# Patient Record
Sex: FEMALE | Race: OTHER | Employment: STUDENT | URBAN - METROPOLITAN AREA
[De-identification: names, ages, dates, MRNs, and addresses within clinical notes are randomized per-mention and may not be internally consistent; named-entity substitution may affect disease eponyms.]

---

## 2023-01-23 ENCOUNTER — OFFICE VISIT (OUTPATIENT)
Dept: HEMATOLOGY | Age: 23
End: 2023-01-23
Payer: OTHER GOVERNMENT

## 2023-01-23 ENCOUNTER — HOSPITAL ENCOUNTER (OUTPATIENT)
Dept: LAB | Age: 23
Discharge: HOME | End: 2023-01-23
Payer: OTHER GOVERNMENT

## 2023-01-23 VITALS
HEIGHT: 62 IN | HEART RATE: 97 BPM | DIASTOLIC BLOOD PRESSURE: 84 MMHG | OXYGEN SATURATION: 98 % | TEMPERATURE: 97.7 F | SYSTOLIC BLOOD PRESSURE: 129 MMHG | BODY MASS INDEX: 26.31 KG/M2 | WEIGHT: 143 LBS

## 2023-01-23 DIAGNOSIS — K76.9 CHRONIC LIVER DISEASE: Primary | ICD-10-CM

## 2023-01-23 DIAGNOSIS — K76.9 CHRONIC LIVER DISEASE: ICD-10-CM

## 2023-01-23 LAB
ALBUMIN SERPL-MCNC: 4.4 G/DL (ref 3.4–5)
ALBUMIN/GLOB SERPL: 1.3 (ref 0.8–1.7)
ALP SERPL-CCNC: 59 U/L (ref 45–117)
ALT SERPL-CCNC: 21 U/L (ref 13–56)
ANION GAP SERPL CALC-SCNC: 7 MMOL/L (ref 3–18)
AST SERPL-CCNC: 21 U/L (ref 10–38)
BASOPHILS # BLD: 0 K/UL (ref 0–0.1)
BASOPHILS NFR BLD: 1 % (ref 0–2)
BILIRUB DIRECT SERPL-MCNC: 0.1 MG/DL (ref 0–0.2)
BILIRUB SERPL-MCNC: 0.5 MG/DL (ref 0.2–1)
BUN SERPL-MCNC: 11 MG/DL (ref 7–18)
BUN/CREAT SERPL: 16 (ref 12–20)
CALCIUM SERPL-MCNC: 9.3 MG/DL (ref 8.5–10.1)
CHLORIDE SERPL-SCNC: 102 MMOL/L (ref 100–111)
CO2 SERPL-SCNC: 28 MMOL/L (ref 21–32)
CREAT SERPL-MCNC: 0.7 MG/DL (ref 0.6–1.3)
DIFFERENTIAL METHOD BLD: ABNORMAL
EOSINOPHIL # BLD: 0.1 K/UL (ref 0–0.4)
EOSINOPHIL NFR BLD: 1 % (ref 0–5)
ERYTHROCYTE [DISTWIDTH] IN BLOOD BY AUTOMATED COUNT: 12.5 % (ref 11.6–14.5)
FERRITIN SERPL-MCNC: 38 NG/ML (ref 8–388)
GLOBULIN SER CALC-MCNC: 3.4 G/DL (ref 2–4)
GLUCOSE SERPL-MCNC: 113 MG/DL (ref 74–99)
HCT VFR BLD AUTO: 37.7 % (ref 35–45)
HGB BLD-MCNC: 12.9 G/DL (ref 12–16)
IMM GRANULOCYTES # BLD AUTO: 0 K/UL (ref 0–0.04)
IMM GRANULOCYTES NFR BLD AUTO: 0 % (ref 0–0.5)
INR PPP: 1 (ref 0.8–1.2)
IRON SATN MFR SERPL: 30 % (ref 20–50)
IRON SERPL-MCNC: 129 UG/DL (ref 50–175)
LYMPHOCYTES # BLD: 1.5 K/UL (ref 0.9–3.6)
LYMPHOCYTES NFR BLD: 22 % (ref 21–52)
MCH RBC QN AUTO: 33.7 PG (ref 24–34)
MCHC RBC AUTO-ENTMCNC: 34.2 G/DL (ref 31–37)
MCV RBC AUTO: 98.4 FL (ref 78–100)
MONOCYTES # BLD: 0.4 K/UL (ref 0.05–1.2)
MONOCYTES NFR BLD: 6 % (ref 3–10)
NEUTS SEG # BLD: 4.8 K/UL (ref 1.8–8)
NEUTS SEG NFR BLD: 70 % (ref 40–73)
NRBC # BLD: 0 K/UL (ref 0–0.01)
NRBC BLD-RTO: 0 PER 100 WBC
PLATELET # BLD AUTO: 291 K/UL (ref 135–420)
PMV BLD AUTO: 10.7 FL (ref 9.2–11.8)
POTASSIUM SERPL-SCNC: 3.6 MMOL/L (ref 3.5–5.5)
PROT SERPL-MCNC: 7.8 G/DL (ref 6.4–8.2)
PROTHROMBIN TIME: 14.1 SEC (ref 11.5–15.2)
RBC # BLD AUTO: 3.83 M/UL (ref 4.2–5.3)
SODIUM SERPL-SCNC: 137 MMOL/L (ref 136–145)
TIBC SERPL-MCNC: 423 UG/DL (ref 250–450)
WBC # BLD AUTO: 6.8 K/UL (ref 4.6–13.2)

## 2023-01-23 PROCEDURE — 87340 HEPATITIS B SURFACE AG IA: CPT

## 2023-01-23 PROCEDURE — 83540 ASSAY OF IRON: CPT

## 2023-01-23 PROCEDURE — 86706 HEP B SURFACE ANTIBODY: CPT

## 2023-01-23 PROCEDURE — 86803 HEPATITIS C AB TEST: CPT

## 2023-01-23 PROCEDURE — 82728 ASSAY OF FERRITIN: CPT

## 2023-01-23 PROCEDURE — 86704 HEP B CORE ANTIBODY TOTAL: CPT

## 2023-01-23 PROCEDURE — 86708 HEPATITIS A ANTIBODY: CPT

## 2023-01-23 PROCEDURE — 86381 MITOCHONDRIAL ANTIBODY EACH: CPT

## 2023-01-23 PROCEDURE — 85025 COMPLETE CBC W/AUTO DIFF WBC: CPT

## 2023-01-23 PROCEDURE — 85610 PROTHROMBIN TIME: CPT

## 2023-01-23 PROCEDURE — 86015 ACTIN ANTIBODY EACH: CPT

## 2023-01-23 PROCEDURE — 86037 ANCA TITER EACH ANTIBODY: CPT

## 2023-01-23 PROCEDURE — 82107 ALPHA-FETOPROTEIN L3: CPT

## 2023-01-23 PROCEDURE — 80076 HEPATIC FUNCTION PANEL: CPT

## 2023-01-23 PROCEDURE — 86038 ANTINUCLEAR ANTIBODIES: CPT

## 2023-01-23 PROCEDURE — 36415 COLL VENOUS BLD VENIPUNCTURE: CPT

## 2023-01-23 PROCEDURE — 80048 BASIC METABOLIC PNL TOTAL CA: CPT

## 2023-01-23 PROCEDURE — 81256 HFE GENE: CPT

## 2023-01-23 NOTE — PROGRESS NOTES
[facility], MD, Liza Parikh, Shay Awani Slim, [place]      ILENE Choi, [facility]-BC   Giovanni Velez, [facility]-   Albert Varner, FNALBA Brito, FNMARY-MONA [facility]      [address]   at [address], [facility] Blanca Verma  22.   [phone]   FAX: [phone]   at [address], [address] [place] [address], [address]   [phone]   FAX: [phone]       Patient Care Team:  Pamela De Jesus MD as PCP - General (Family Medicine)    Problem List  Date Reviewed: 1/23/2023   None      The clinicians listed above have asked me to see Demetrio Calvillo in consultation regarding elevated liver enzymes and its management. No medical records were available for review when the patient was here for the appointment. The patient is a 25 y.o.  female who was found to have elevated liver enzymes in 09/2022. Serologic evaluation for markers of chronic liver disease has either not been performed or the results     Imaging of the liver was either not performed or the results are not available to me. The patient had not started any new medications within 3 months preceding the elevation in liver chemistries. The patient has no complaints which can be attributed to liver disease. The patient completes all daily activities without any functional limitations.  The patient has not experienced fatigue, fevers, chills, shortness of breath, chest pain, pain in the right side over the liver, diffuse abdominal pain, nausea, vomiting, constipation, diarrhrea, dry eyes, dry mouth, arthralgias, myalgias, yellowing of the eyes or skin, itching, dark urine, problems concentrating, swelling of the abdomen, swelling of the lower extremities, hematemesis, or hematochezia. ASSESSMENT AND PLAN:  Elevated liver enzymes  Persistent elevation in liver transaminases of unclear etiology at this time. She was told that she has hemochromatosis, but there is no record of this. It does not appear that HFE genetic testing has been performed. Will perform laboratory testing to monitor liver function and degree of liver injury. The most recent laboratory studies indicate that the liver transaminases are normal, alkaline phosphatase is normal, tests of hepatic synthetic and metabolic function are normal, and the platelet count is normal.      Based upon laboratory studies the patient does not appear to have advanced liver disease or cirrhosis. Serologic testing for causes of chronic liver disease was ordered. This includes genetic mutation testing for hemochromatosis. The most likely causes for the liver chemistry abnormalities were discussed with the patient and include   fatty liver disease, immune liver disorders, genetic metabolic liver disorders. The need to perform an assessment of liver fibrosis was discussed with the patient. The Fibroscan can assess liver fibrosis and determine if a patient has advanced fibrosis or cirrhosis without the need for liver biopsy. This will be performed at the next office visit. If the Fibroscan suggests advanced fibrosis then a liver biopsy should be considered. The Fibroscan can be repeated annually or as often as clinically indicated to assess for fibrosis progression and/or regression. If the Fibroscan suggests there is none or minimal liver injury the patient will be monitored at 6 month intervals. If the liver enzymes remain normal and the liver stiffness by Fibroscan remains normal or near normal over the next 1-2 years than no further monitoring is needed.   If the liver enzymes remain intermittently elevated or become persistently elevated and/or the Fibroscan suggests that liver stiffness is increasing over the next 1-2 years than a liver biopsy should be considered. Will perform imaging of the liver with ultrasound. The need to perform a liver biopsy to help determine the cause and severity of the liver test abnormalities was discussed. The risks of performing the liver biopsy including pain, puncture of the lung, gallbladder, intestine or kidney and bleeding were discussed. Will defer liver biopsy for now. If the liver enzymes remain persistently elevated over the next 1-2 years a liver biopsy should be performed to ensure there is no ongoing chronic liver disease. Screening for Hepatocellular Carcinoma  HCC screening: AFP was ordered today and ultrasound will be scheduled. Treatment of other medical problems in patients with chronic liver disease  There are no contraindications for the patient to take most medications that are necessary for treatment of other medical issues. The patient can take any medications utilized for treatment of DM, statins to treat hypercholesterolemia  Normal doses of acetaminophen, as recommended on the label of the bottle, are not hepatotoxic except in the setting of daily alcohol use, even in patients with cirrhosis and can be utilized for pain. Counseling for alcohol in patients with chronic liver disease  The patient was counseled regarding alcohol consumption and the effect of alcohol on chronic liver disease. The patient does not consume any significant amount of alcohol. Vaccinations   The need for vaccination against viral hepatitis A and B will be assessed with serologic and instituted as appropriate. The patient has received 2 doses and the 2 booster doses of COVID-19 vaccine. The patient had COVID-19 infection and recovered. Routine vaccinations against other bacterial and viral agents can be performed as indicated. Annual flu vaccination should be administered if indicated.     ALLERGIES  No Known Allergies    MEDICATIONS  No current outpatient medications on file. No current facility-administered medications for this visit. SYSTEM REVIEW NOT RELATED TO LIVER DISEASE OR REVIEWED ABOVE:  Constitution systems: Negative for fever, chills, weight gain, weight loss. Eyes: Negative for visual changes. ENT: Negative for sore throat, painful swallowing. Respiratory: Negative for cough, hemoptysis, SOB. Cardiology: Negative for chest pain, palpitations. GI:  Negative for constipation or diarrhea. : Negative for urinary frequency, dysuria, hematuria, nocturia. Skin: Negative for rash. Hematology: Negative for easy bruising, blood clots. Musculo-skelatal: Negative for back pain, muscle pain, weakness. Neurologic: Negative for headaches, dizziness, vertigo, memory problems not related to HE. Psychology: Negative for anxiety, depression. FAMILY HISTORY:  The father has the following following chronic disease(s): HTN, CHOL. He is 46 y/o  The mother has the following chronic disease(s): none. She is 47 y/o. There is no family history of liver disease. The following family members have liver disease: There is no family history of immune disorders. The following family members have immune disorders:    SOCIAL HISTORY:  The patient has never been . The patient has no children. The patient has never used tobacco products. The patient has never consumed significant amounts of alcohol. She consumes alcohol on social occasions never in excess. The patient currently works full time as student at [employer]. PHYSICAL EXAMINATION:  Visit Vitals  /84   Pulse 97   Temp 97.7 °F (36.5 °C)   Ht 5' 2\" (1.575 m)   Wt 143 lb (64.9 kg)   SpO2 98%   BMI 26.16 kg/m²     General: No acute distress. Eyes: Sclera anicteric. ENT: No oral lesions. Thyroid normal.  Nodes: No adenopathy. Skin: No spider angiomata. No jaundice. No palmar erythema.   Respiratory: Lungs clear to auscultation. Cardiovascular: Regular heart rate. No murmurs. No JVD. Abdomen: Soft non-tender. Liver size normal to percussion/palpation. Spleen not palpable. No obvious ascites. Extremities: No edema. No muscle wasting. No gross arthritic changes. Neurologic: Alert and oriented. Cranial nerves grossly intact. No asterixis. LABORATORY STUDIES:  [facility] [place] of [address] [place] [address] & Units 1/23/2023   WBC 4.6 - 13.2 K/uL 6.8   ANC 1.8 - 8.0 K/UL 4.8   HGB 12.0 - 16.0 g/dL 12.9    - 420 K/uL 291   INR 0.8 - 1.2   1.0   AST 10 - 38 U/L 21   ALT 13 - 56 U/L 21   Alk Phos 45 - 117 U/L 59   Bili, Total 0.2 - 1.0 MG/DL 0.5   Bili, Direct 0.0 - 0.2 MG/DL 0.1   Albumin 3.4 - 5.0 g/dL 4.4   BUN 7.0 - 18 MG/DL 11   Creat 0.6 - 1.3 MG/DL 0.70   Na 136 - 145 mmol/L 137   K 3.5 - 5.5 mmol/L 3.6   Cl 100 - 111 mmol/L 102   CO2 21 - 32 mmol/L 28   Glucose 74 - 99 mg/dL 113 (H)     SEROLOGIES:  Serologies Latest Ref Rng & Units 1/23/2023   Hep A Ab, Total Negative   Positive (A)   Hep B Surface Ag <1.00 Index <0.10   Hep B Surface Ag Interp NEG   Negative   Hep B Core Ab, Total Negative   Negative   Hep B Surface Ab >10.0 mIU/mL <3.10 (L)   Hep B Surface Ab Interp POS   Negative (A)   Hep C Ab 0.0 - 0.9 s/co ratio <0.1   Ferritin 8 - 388 NG/ML 38   Iron % Saturation 20 - 50 % 30   ISAAC, IFA  Negative   C-ANCA Neg:<1:20 titer <1:20   P-ANCA Neg:<1:20 titer <1:20   ANCA Neg:<1:20 titer <1:20   ASMCA 0 - 19 Units 5   M2 Ab 0.0 - 20.0 Units <20.0       LIVER HISTOLOGY:  Not available or performed    ENDOSCOPIC PROCEDURES:  Not available or performed    RADIOLOGY:  Not available or performed    OTHER TESTING:  Not available or performed    FOLLOW-UP:  All of the issues listed above in the Assessment and Plan were discussed with the patient. All questions were answered. The patient expressed a clear understanding of the above.     [address] in 4 weeks for Fibroscan and to review all data and determine the treatment plan.       ANDREW Hunter  [facility] [place] of [address], [address] [place] [address] Sasha French, [address] [place] [address]   [phone]

## 2023-01-24 LAB
HAV AB SER QL IA: POSITIVE
HBV CORE AB SERPL QL IA: NEGATIVE
HBV SURFACE AB SER QL IA: NEGATIVE
HBV SURFACE AB SERPL IA-ACNC: <3.1 MIU/ML
HBV SURFACE AG SER QL: <0.1 INDEX
HBV SURFACE AG SER QL: NEGATIVE
HCV AB S/CO SERPL IA: <0.1 S/CO RATIO (ref 0–0.9)
HCV AB SERPL QL IA: NORMAL
HEP BS AB COMMENT,HBSAC: ABNORMAL

## 2023-01-25 LAB
AFP L3 MFR SERPL: NORMAL % (ref 0–9.9)
AFP SERPL-MCNC: 1.3 NG/ML (ref 0–4.7)
ANA TITR SER IF: NEGATIVE
C-ANCA TITR SER IF: NORMAL TITER
MITOCHONDRIA M2 IGG SER-ACNC: <20 UNITS (ref 0–20)
P-ANCA ATYPICAL TITR SER IF: NORMAL TITER
P-ANCA TITR SER IF: NORMAL TITER
SMA IGG SER-ACNC: 5 UNITS (ref 0–19)

## 2023-01-27 LAB — HFE GENE MUT ANL BLD/T: NORMAL

## 2023-02-03 ENCOUNTER — HOSPITAL ENCOUNTER (OUTPATIENT)
Dept: ULTRASOUND IMAGING | Age: 23
Discharge: HOME | End: 2023-02-03
Payer: OTHER GOVERNMENT

## 2023-02-03 DIAGNOSIS — K76.9 CHRONIC LIVER DISEASE: ICD-10-CM

## 2023-02-03 PROCEDURE — 76700 US EXAM ABDOM COMPLETE: CPT

## 2023-02-03 PROCEDURE — 93976 VASCULAR STUDY: CPT
